# Patient Record
Sex: FEMALE | Race: ASIAN | NOT HISPANIC OR LATINO | Employment: PART TIME | URBAN - METROPOLITAN AREA
[De-identification: names, ages, dates, MRNs, and addresses within clinical notes are randomized per-mention and may not be internally consistent; named-entity substitution may affect disease eponyms.]

---

## 2023-06-15 LAB — KEPPRA: 33 UG/ML (ref 10–40)

## 2023-08-09 ENCOUNTER — OFFICE VISIT (OUTPATIENT)
Dept: PRIMARY CARE | Facility: CLINIC | Age: 23
End: 2023-08-09
Payer: COMMERCIAL

## 2023-08-09 VITALS
BODY MASS INDEX: 18.91 KG/M2 | RESPIRATION RATE: 16 BRPM | WEIGHT: 93.8 LBS | HEART RATE: 63 BPM | TEMPERATURE: 97.5 F | SYSTOLIC BLOOD PRESSURE: 105 MMHG | DIASTOLIC BLOOD PRESSURE: 69 MMHG | OXYGEN SATURATION: 100 % | HEIGHT: 59 IN

## 2023-08-09 DIAGNOSIS — Z11.1 SCREENING FOR TUBERCULOSIS: ICD-10-CM

## 2023-08-09 DIAGNOSIS — Z87.898 HISTORY OF SEIZURE: ICD-10-CM

## 2023-08-09 DIAGNOSIS — Z76.89 ENCOUNTER TO ESTABLISH CARE: Primary | ICD-10-CM

## 2023-08-09 PROCEDURE — 1036F TOBACCO NON-USER: CPT | Performed by: NURSE PRACTITIONER

## 2023-08-09 PROCEDURE — 99202 OFFICE O/P NEW SF 15 MIN: CPT | Performed by: NURSE PRACTITIONER

## 2023-08-09 RX ORDER — LEVETIRACETAM 1000 MG/1
1000 TABLET ORAL 2 TIMES DAILY
COMMUNITY

## 2023-08-09 ASSESSMENT — ENCOUNTER SYMPTOMS
CHILLS: 0
APPETITE CHANGE: 0
FATIGUE: 0
DIAPHORESIS: 0
UNEXPECTED WEIGHT CHANGE: 0
ACTIVITY CHANGE: 0
FEVER: 0

## 2023-08-09 NOTE — PROGRESS NOTES
"Subjective   Patient ID: Janelle Dias is a 23 y.o. female who presents for     HPI   Patient in office to establish care. Hx of grand-mal seizure (2021). Controlled on Keppra. She sees neurology regularly. Needs tuberculosis testing for school. No other concerns today.     Review of Systems   Constitutional:  Negative for activity change, appetite change, chills, diaphoresis, fatigue, fever and unexpected weight change.       Objective   /69   Pulse 63   Temp 36.4 °C (97.5 °F)   Resp 16   Ht 1.486 m (4' 10.5\")   Wt (!) 42.5 kg (93 lb 12.8 oz)   SpO2 100%   BMI 19.27 kg/m²     Physical Exam  Constitutional:       Appearance: Normal appearance.   Neurological:      Mental Status: She is alert.       Assessment/Plan     Exam findings reviewed with patient. 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 The patient will bring updated records of her immunizations.      "

## 2023-08-14 ENCOUNTER — OFFICE VISIT (OUTPATIENT)
Dept: PRIMARY CARE | Facility: CLINIC | Age: 23
End: 2023-08-14
Payer: COMMERCIAL

## 2023-08-14 VITALS
WEIGHT: 93 LBS | RESPIRATION RATE: 16 BRPM | OXYGEN SATURATION: 99 % | TEMPERATURE: 98 F | HEIGHT: 59 IN | SYSTOLIC BLOOD PRESSURE: 97 MMHG | BODY MASS INDEX: 18.75 KG/M2 | DIASTOLIC BLOOD PRESSURE: 67 MMHG | HEART RATE: 65 BPM

## 2023-08-14 DIAGNOSIS — N92.6 IRREGULAR PERIODS: ICD-10-CM

## 2023-08-14 DIAGNOSIS — Z00.00 HEALTHCARE MAINTENANCE: Primary | ICD-10-CM

## 2023-08-14 DIAGNOSIS — Z12.4 CERVICAL CANCER SCREENING: ICD-10-CM

## 2023-08-14 DIAGNOSIS — Z87.898 HISTORY OF SEIZURE: ICD-10-CM

## 2023-08-14 PROBLEM — R56.9 SEIZURE (MULTI): Status: ACTIVE | Noted: 2023-08-14

## 2023-08-14 PROBLEM — Q28.3 CEREBRAL CAVERNOMA (HHS-HCC): Status: ACTIVE | Noted: 2023-08-14

## 2023-08-14 PROBLEM — J30.9 ACUTE ALLERGIC RHINITIS: Status: RESOLVED | Noted: 2020-06-17 | Resolved: 2023-08-14

## 2023-08-14 PROCEDURE — 1036F TOBACCO NON-USER: CPT | Performed by: NURSE PRACTITIONER

## 2023-08-14 PROCEDURE — 99395 PREV VISIT EST AGE 18-39: CPT | Performed by: NURSE PRACTITIONER

## 2023-08-14 NOTE — PROGRESS NOTES
Subjective   Patient ID: Janelle Dias is a 23 y.o. female who presents for Annual Exam (Brought in school form to be filled out.).    HPI   Patient in office for physical. Patient describes health as good. Patient has regular dental visits. Patient denies vision changes. Patient denies hearing loss.     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 Patient reports diverse and healthy diet. Patient exercises regularly. Patient does not smoke; does not drink alcohol. No drug use.     Reproductive health:  GTAL: 0  Menstrual cycle: irregular (for years)     Screens:  Female:  PAP/HPV: Reviewed and current. Has not had a PAP screen; will refer to OB/GYN  Breast Cancer: Reviewed and current. Does monthly SBE  Colon cancer screening: Reviewed and current  Vision/hearing: Reviewed and current.      Immunizations:  TDaP: 2023  Flu: Patient will get booster at pharmacy  COVID: Patient will get booster at pharmacy  Hep B: vaccinated  MMR: vaccinated  Varicella: vaccinated  T-Spot: pending  Bexsero: not received, patient declines; she will do research     Concerns:  -HX of seizures; controlled Keppra; patient follows up with neurology    Review of Systems  Constitutional:  Negative for activity change, appetite change, chills, diaphoresis, fatigue, fever and unexpected weight change.   HENT:  Negative for congestion, dental problem, drooling, ear discharge, ear pain, facial swelling, hearing loss, mouth sores, nosebleeds, postnasal drip, rhinorrhea, sinus pressure, sinus pain, sneezing, sore throat, tinnitus, trouble swallowing and voice change. Eyes:  Negative for photophobia, pain, discharge, redness, itching and visual disturbance.   Respiratory:  Negative for apnea, cough, choking, chest tightness, shortness of breath, wheezing and stridor.    Cardiovascular:  Negative for chest pain, palpitations and leg swelling.   Gastrointestinal:  Negative for abdominal distention, abdominal pain, anal bleeding, blood in stool,  "constipation, diarrhea, nausea, rectal pain and vomiting.   Endocrine: Negative for cold intolerance, heat intolerance, polydipsia, polyphagia and polyuria.   Genitourinary:  Negative for decreased urine volume, difficulty urinating, dysuria, enuresis, flank pain, frequency, hematuria, menstrual problem, pelvic pain and urgency.   Musculoskeletal:  Negative for arthralgias, back pain, gait problem, joint swelling, myalgias, neck pain and neck stiffness.   Skin:  Negative for color change, pallor, rash and wound.   Neurological:  Negative for dizziness, tremors, seizures, syncope, facial asymmetry, speech difficulty, weakness, light-headedness, numbness and headaches.   Hematological:  Negative for adenopathy. Does not bruise/bleed easily.   Psychiatric/Behavioral:  Negative for agitation, behavioral problems, confusion, decreased concentration, dysphoric mood, hallucinations, self-injury, sleep disturbance and suicidal ideas. The patient is not nervous/anxious and is not hyperactive.      Objective   BP 97/67   Pulse 65   Temp 36.7 °C (98 °F)   Resp 16   Ht 1.486 m (4' 10.5\")   Wt (!) 42.2 kg (93 lb)   SpO2 99%   BMI 19.11 kg/m²     Physical Exam  Constitutional:       Appearance: Normal appearance.   HENT:      Head: Normocephalic.      Right Ear: Tympanic membrane, ear canal and external ear normal.      Left Ear: Tympanic membrane, ear canal and external ear normal.      Nose: Nose normal.      Mouth/Throat:      Mouth: Mucous membranes are moist.      Pharynx: Oropharynx is clear.   Eyes:      General: No scleral icterus.        Right eye: No discharge.         Left eye: No discharge.      Conjunctiva/sclera: Conjunctivae normal.      Pupils: Pupils are equal, round, and reactive to light.   Neck:      Vascular: No carotid bruit.   Cardiovascular:      Rate and Rhythm: Normal rate and regular rhythm.      Pulses: Normal pulses.      Heart sounds: Normal heart sounds.   Pulmonary:      Effort: Pulmonary " effort is normal.      Breath sounds: Normal breath sounds.   Abdominal:      General: Abdomen is flat. Bowel sounds are normal. There is no distension.      Palpations: Abdomen is soft. There is no mass.      Tenderness: There is no abdominal tenderness. There is no guarding or rebound.      Hernia: No hernia is present.   Musculoskeletal:         General: No swelling, tenderness, deformity or signs of injury.      Cervical back: Normal range of motion and neck supple. No rigidity or tenderness.      Right lower leg: No edema.      Left lower leg: No edema.   Lymphadenopathy:      Cervical: No cervical adenopathy.   Skin:     General: Skin is warm.      Coloration: Skin is not jaundiced or pale.      Findings: No bruising, erythema, lesion or rash.   Neurological:      General: No focal deficit present.      Mental Status: She is alert and oriented to person, place, and time.      Cranial Nerves: No cranial nerve deficit.      Sensory: No sensory deficit.      Motor: No weakness.      Coordination: Coordination normal.      Gait: Gait normal.   Psychiatric:         Mood and Affect: Mood normal.         Behavior: Behavior normal.         Thought Content: Thought content normal.         Judgment: Judgment normal.      Assessment/Plan     Exam findings reviewed with patient. Health screens, immunizations, specialty provider follow-ups, and medications discussed. Cleared for school. Follow-up in 12 months for physical or earlier as needed.      Benefits of healthy lifestyle discussed: low carbohydrates/fat/sugar diet; use lean white instead of red meat; use several servings of fruit and vegetables daily; use whole grain flour instead of white flour products; cook at home frequently instead of eating out; exercise 30-90 minutes 5 x/week; good sleep hygiene; stress reduction and prevention strategies; avoid stimulants like caffeine and nicotine; avoid depressants like alcohol; maintain adequate support systems and  positive relationships.

## 2023-08-30 ENCOUNTER — CLINICAL SUPPORT (OUTPATIENT)
Dept: PRIMARY CARE | Facility: CLINIC | Age: 23
End: 2023-08-30
Payer: COMMERCIAL

## 2023-08-30 DIAGNOSIS — Z00.00 HEALTHCARE MAINTENANCE: Primary | ICD-10-CM

## 2023-08-30 LAB
POC APPEARANCE, URINE: CLEAR
POC BILIRUBIN, URINE: NEGATIVE
POC BLOOD, URINE: ABNORMAL
POC COLOR, URINE: YELLOW
POC GLUCOSE, URINE: NEGATIVE MG/DL
POC KETONES, URINE: NEGATIVE MG/DL
POC LEUKOCYTES, URINE: NEGATIVE
POC NITRITE,URINE: NEGATIVE
POC PH, URINE: 5.5 PH
POC PROTEIN, URINE: NEGATIVE MG/DL
POC SPECIFIC GRAVITY, URINE: 1.02
POC UROBILINOGEN, URINE: 0.2 EU/DL

## 2023-08-30 PROCEDURE — 81003 URINALYSIS AUTO W/O SCOPE: CPT | Performed by: NURSE PRACTITIONER

## 2023-08-30 NOTE — RESULT ENCOUNTER NOTE
Please call the patient. There is moderate blood in her urine. If she is currently on her period, this would be normal. If she is not on her period, I would like to recheck her urine in 1-2 weeks or earlier if she has any urinary symptoms. Please let me know and schedule a follow-up nursing visit as indicated. [facility]ocate proper hydration. TY

## 2024-06-16 ENCOUNTER — PATIENT MESSAGE (OUTPATIENT)
Dept: NEUROLOGY | Facility: CLINIC | Age: 24
End: 2024-06-16
Payer: COMMERCIAL

## 2024-06-16 DIAGNOSIS — R56.9 SEIZURE (MULTI): Primary | ICD-10-CM

## 2024-06-17 RX ORDER — LEVETIRACETAM 1000 MG/1
1000 TABLET ORAL 2 TIMES DAILY
Qty: 60 TABLET | Refills: 6 | Status: SHIPPED | OUTPATIENT
Start: 2024-06-17

## 2024-07-24 DIAGNOSIS — Z11.1 SCREENING FOR TUBERCULOSIS: Primary | ICD-10-CM

## 2024-07-31 LAB
NON-UH HIE QUANTIFERON MITOGEN MINUS NIL: 9.57 IU/ML
NON-UH HIE QUANTIFERON NIL: 0.44 IU/ML
NON-UH HIE QUANTIFERON PLUS TB1 MINUS NIL: 0.15 IU/ML
NON-UH HIE QUANTIFERON PLUS TB2 MINUS NIL: 0 IU/ML
NON-UH HIE QUANTIFERON TB GOLD PLUS: NEGATIVE

## 2024-11-18 ENCOUNTER — APPOINTMENT (OUTPATIENT)
Dept: NEUROLOGY | Facility: CLINIC | Age: 24
End: 2024-11-18
Payer: COMMERCIAL

## 2025-04-08 DIAGNOSIS — R56.9 SEIZURE (MULTI): ICD-10-CM

## 2025-04-08 RX ORDER — LEVETIRACETAM 1000 MG/1
1000 TABLET ORAL 2 TIMES DAILY
Qty: 60 TABLET | Refills: 0 | Status: SHIPPED | OUTPATIENT
Start: 2025-04-08